# Patient Record
Sex: MALE | Race: WHITE | NOT HISPANIC OR LATINO | URBAN - METROPOLITAN AREA
[De-identification: names, ages, dates, MRNs, and addresses within clinical notes are randomized per-mention and may not be internally consistent; named-entity substitution may affect disease eponyms.]

---

## 2018-09-05 ENCOUNTER — APPOINTMENT (OUTPATIENT)
Dept: URBAN - METROPOLITAN AREA CLINIC 282 | Age: 63
Setting detail: DERMATOLOGY
End: 2018-09-05

## 2018-09-05 DIAGNOSIS — L21.8 OTHER SEBORRHEIC DERMATITIS: ICD-10-CM

## 2018-09-05 DIAGNOSIS — L30.4 ERYTHEMA INTERTRIGO: ICD-10-CM

## 2018-09-05 DIAGNOSIS — L85.3 XEROSIS CUTIS: ICD-10-CM

## 2018-09-05 PROCEDURE — OTHER COUNSELING: OTHER

## 2018-09-05 PROCEDURE — 99203 OFFICE O/P NEW LOW 30 MIN: CPT

## 2018-09-05 PROCEDURE — OTHER PRESCRIPTION: OTHER

## 2018-09-05 PROCEDURE — OTHER MIPS QUALITY: OTHER

## 2018-09-05 RX ORDER — IODOQUINOL, HYDROCORTISONE ACETATE AND ALOE VERA LEAF 10; 20; 10 MG/G; MG/G; MG/G
GEL TOPICAL
Qty: 1 | Refills: 1 | Status: ERX | COMMUNITY
Start: 2018-09-05

## 2018-09-05 RX ORDER — KETOCONAZOLE 20 MG/ML
SHAMPOO TOPICAL
Qty: 1 | Refills: 2 | Status: ERX | COMMUNITY
Start: 2018-09-05

## 2018-09-05 ASSESSMENT — LOCATION DETAILED DESCRIPTION DERM
LOCATION DETAILED: LEFT CENTRAL MALAR CHEEK
LOCATION DETAILED: RIGHT PROXIMAL DORSAL FOREARM
LOCATION DETAILED: LEFT MEDIAL INFERIOR CHEST
LOCATION DETAILED: LEFT PROXIMAL DORSAL FOREARM

## 2018-09-05 ASSESSMENT — LOCATION ZONE DERM
LOCATION ZONE: TRUNK
LOCATION ZONE: ARM
LOCATION ZONE: FACE

## 2018-09-05 ASSESSMENT — LOCATION SIMPLE DESCRIPTION DERM
LOCATION SIMPLE: CHEST
LOCATION SIMPLE: LEFT FOREARM
LOCATION SIMPLE: LEFT CHEEK
LOCATION SIMPLE: RIGHT FOREARM

## 2018-09-26 ENCOUNTER — APPOINTMENT (OUTPATIENT)
Dept: URBAN - METROPOLITAN AREA CLINIC 282 | Age: 63
Setting detail: DERMATOLOGY
End: 2018-09-27

## 2018-09-26 DIAGNOSIS — L21.8 OTHER SEBORRHEIC DERMATITIS: ICD-10-CM

## 2018-09-26 PROCEDURE — OTHER COUNSELING: OTHER

## 2018-09-26 PROCEDURE — OTHER TREATMENT REGIMEN: OTHER

## 2018-09-26 PROCEDURE — 99213 OFFICE O/P EST LOW 20 MIN: CPT

## 2018-09-26 PROCEDURE — OTHER MIPS QUALITY: OTHER

## 2018-09-26 ASSESSMENT — LOCATION DETAILED DESCRIPTION DERM: LOCATION DETAILED: LEFT CENTRAL MALAR CHEEK

## 2018-09-26 ASSESSMENT — LOCATION ZONE DERM: LOCATION ZONE: FACE

## 2018-09-26 ASSESSMENT — LOCATION SIMPLE DESCRIPTION DERM: LOCATION SIMPLE: LEFT CHEEK

## 2018-09-26 NOTE — PROCEDURE: TREATMENT REGIMEN
Detail Level: Zone
Plan: Patient to alternate Nizoral shampoo with Alcortin\\n\\nWill reeval in 6 weeks
Samples Given: Cereve SA moisturizer
Otc Regimen: Cerave SA moisturizer BID\\nCerave moisturizing sunscreen in the AM

## 2018-11-07 ENCOUNTER — APPOINTMENT (OUTPATIENT)
Dept: URBAN - METROPOLITAN AREA CLINIC 282 | Age: 63
Setting detail: DERMATOLOGY
End: 2018-11-08

## 2018-11-07 DIAGNOSIS — L21.8 OTHER SEBORRHEIC DERMATITIS: ICD-10-CM

## 2018-11-07 PROCEDURE — 99213 OFFICE O/P EST LOW 20 MIN: CPT

## 2018-11-07 PROCEDURE — OTHER PRESCRIPTION: OTHER

## 2018-11-07 PROCEDURE — OTHER COUNSELING: OTHER

## 2018-11-07 PROCEDURE — OTHER TREATMENT REGIMEN: OTHER

## 2018-11-07 PROCEDURE — OTHER MIPS QUALITY: OTHER

## 2018-11-07 RX ORDER — PYRITHIONE ZINC 2 %
SHAMPOO TOPICAL
Qty: 1 | Refills: 2 | Status: ERX | COMMUNITY
Start: 2018-11-07

## 2018-11-07 ASSESSMENT — LOCATION ZONE DERM: LOCATION ZONE: FACE

## 2018-11-07 ASSESSMENT — LOCATION SIMPLE DESCRIPTION DERM: LOCATION SIMPLE: LEFT CHEEK

## 2018-11-07 ASSESSMENT — LOCATION DETAILED DESCRIPTION DERM: LOCATION DETAILED: LEFT CENTRAL MALAR CHEEK

## 2018-11-07 NOTE — PROCEDURE: TREATMENT REGIMEN
Detail Level: Zone
Samples Given: DHS zinc shampoo
Plan: Continue Ketoconozole wash M,W,F\\nAdd DHS zinc wash T,TH,S
Otc Regimen: Cerave SA moisturizer BID\\nCerave moisturizing sunscreen in the AM

## 2019-05-08 ENCOUNTER — APPOINTMENT (OUTPATIENT)
Dept: URBAN - METROPOLITAN AREA CLINIC 282 | Age: 64
Setting detail: DERMATOLOGY
End: 2019-05-10

## 2019-05-08 DIAGNOSIS — L21.8 OTHER SEBORRHEIC DERMATITIS: ICD-10-CM

## 2019-05-08 PROCEDURE — OTHER TREATMENT REGIMEN: OTHER

## 2019-05-08 PROCEDURE — 99213 OFFICE O/P EST LOW 20 MIN: CPT

## 2019-05-08 PROCEDURE — OTHER COUNSELING: OTHER

## 2019-05-08 PROCEDURE — OTHER PRESCRIPTION: OTHER

## 2019-05-08 RX ORDER — EMOLLIENT COMBINATION NO.107
CREAM (GRAM) TOPICAL
Qty: 2 | Refills: 2 | Status: ERX | COMMUNITY
Start: 2019-05-08

## 2019-05-08 ASSESSMENT — LOCATION SIMPLE DESCRIPTION DERM
LOCATION SIMPLE: RIGHT CHEEK
LOCATION SIMPLE: LEFT CHEEK

## 2019-05-08 ASSESSMENT — LOCATION DETAILED DESCRIPTION DERM
LOCATION DETAILED: LEFT CENTRAL MALAR CHEEK
LOCATION DETAILED: RIGHT CENTRAL MALAR CHEEK

## 2019-05-08 ASSESSMENT — LOCATION ZONE DERM: LOCATION ZONE: FACE

## 2019-05-08 ASSESSMENT — SEVERITY ASSESSMENT: HOW SEVERE IS THIS PATIENT'S CONDITION?: MODERATE

## 2019-05-08 NOTE — PROCEDURE: TREATMENT REGIMEN
Detail Level: Zone
Plan: 4 week follow-up
Continue Regimen: Ketoconazole shampoo in shower TIW
Otc Regimen: Cerave SA at night after RX\\nCerave AM with sunscreen after RX
Initiate Treatment: Nutraseb BID x 4 weeks